# Patient Record
Sex: FEMALE | Race: WHITE
[De-identification: names, ages, dates, MRNs, and addresses within clinical notes are randomized per-mention and may not be internally consistent; named-entity substitution may affect disease eponyms.]

---

## 2018-04-05 ENCOUNTER — HOSPITAL ENCOUNTER (OUTPATIENT)
Dept: HOSPITAL 61 - US | Age: 68
Discharge: HOME | End: 2018-04-05
Attending: INTERNAL MEDICINE
Payer: MEDICARE

## 2018-04-05 DIAGNOSIS — Z87.891: ICD-10-CM

## 2018-04-05 DIAGNOSIS — R53.83: ICD-10-CM

## 2018-04-05 DIAGNOSIS — I73.9: ICD-10-CM

## 2018-04-05 DIAGNOSIS — J44.9: ICD-10-CM

## 2018-04-05 DIAGNOSIS — I10: Primary | ICD-10-CM

## 2018-04-05 PROCEDURE — 93925 LOWER EXTREMITY STUDY: CPT

## 2018-04-05 PROCEDURE — 93306 TTE W/DOPPLER COMPLETE: CPT

## 2020-07-29 ENCOUNTER — HOSPITAL ENCOUNTER (OUTPATIENT)
Dept: HOSPITAL 61 - KCIC | Age: 70
Discharge: HOME | End: 2020-07-29
Attending: NURSE PRACTITIONER
Payer: MEDICARE

## 2020-07-29 DIAGNOSIS — J45.901: Primary | ICD-10-CM

## 2020-07-29 PROCEDURE — 71046 X-RAY EXAM CHEST 2 VIEWS: CPT

## 2020-07-29 NOTE — [facility]
EXAM: Chest, 2 views.

 

HISTORY: Asthma.

 

COMPARISON: None.

 

FINDINGS: 2 views of the chest are obtained. There is no infiltrate, 

pleural effusion or pneumothorax. The heart is normal in size. There are 

right axillary clips.

 

IMPRESSION: No acute pulmonary finding.

 

Electronically signed by: Anastacia Gill MD (7/29/2020 4:08 PM) [id number]